# Patient Record
Sex: MALE | Race: BLACK OR AFRICAN AMERICAN | URBAN - METROPOLITAN AREA
[De-identification: names, ages, dates, MRNs, and addresses within clinical notes are randomized per-mention and may not be internally consistent; named-entity substitution may affect disease eponyms.]

---

## 2020-12-14 ENCOUNTER — OFFICE VISIT (OUTPATIENT)
Dept: URBAN - METROPOLITAN AREA CLINIC 74 | Facility: CLINIC | Age: 30
End: 2020-12-14

## 2023-07-14 ENCOUNTER — CLAIMS CREATED FROM THE CLAIM WINDOW (OUTPATIENT)
Dept: URBAN - METROPOLITAN AREA CLINIC 40 | Facility: CLINIC | Age: 33
End: 2023-07-14
Payer: SELF-PAY

## 2023-07-14 ENCOUNTER — OFFICE VISIT (OUTPATIENT)
Dept: URBAN - METROPOLITAN AREA CLINIC 40 | Facility: CLINIC | Age: 33
End: 2023-07-14

## 2023-07-14 ENCOUNTER — WEB ENCOUNTER (OUTPATIENT)
Dept: URBAN - METROPOLITAN AREA CLINIC 40 | Facility: CLINIC | Age: 33
End: 2023-07-14

## 2023-07-14 VITALS
SYSTOLIC BLOOD PRESSURE: 110 MMHG | HEART RATE: 80 BPM | HEIGHT: 71 IN | WEIGHT: 185 LBS | TEMPERATURE: 95.9 F | BODY MASS INDEX: 25.9 KG/M2 | DIASTOLIC BLOOD PRESSURE: 80 MMHG

## 2023-07-14 DIAGNOSIS — R12 CHRONIC HEARTBURN: ICD-10-CM

## 2023-07-14 DIAGNOSIS — K21.9 ACID REFLUX: ICD-10-CM

## 2023-07-14 PROBLEM — 266435005: Status: ACTIVE | Noted: 2023-07-14

## 2023-07-14 PROCEDURE — 99203 OFFICE O/P NEW LOW 30 MIN: CPT | Performed by: INTERNAL MEDICINE

## 2023-07-14 PROCEDURE — 99203 OFFICE O/P NEW LOW 30 MIN: CPT | Performed by: NURSE PRACTITIONER

## 2023-07-14 RX ORDER — OMEPRAZOLE 40 MG/1
1 CAPSULE 30 MINUTES BEFORE MORNING MEAL CAPSULE, DELAYED RELEASE ORAL ONCE A DAY
Qty: 30 | Refills: 3 | OUTPATIENT
Start: 2023-07-14

## 2023-07-14 NOTE — PHYSICAL EXAM GASTROINTESTINAL
Abdomen , soft, nontender, nondistended , no guarding or rigidity , no masses palpable , normal bowel sounds , Liver and Spleen,  no hepatosplenomegaly , liver nontender, healed surgical scar noted to RLQ

## 2023-07-14 NOTE — HPI-TODAY'S VISIT:
31 y/o male new patient presents with c/o heartburn and reflux for past 5-6 years, he states it has gotten worse past 2 months. He says he took zantac over the counter for years until it was recalled and now for the past 6 months he has been taking OTC nexium and pepcid every day. He takes nexium 1 pill every day and pepcid most days. He says he has stomach pain and points to epigastric region that comes and goes, non radiating. He says the pepcid will help the heart burn pain when he takes it. He says he does have reflux at times. He denies dysphagia, globus. He denies constipation, diarrhea, blood in stool, lower abdominal pain. He has occasional loud stomach noises. He denies feeling relief by eating or coating his stomach. He says he can not pin point any food triggers and that the heart burn and epigastric pain happens all the time regardless of what food he eats. His diet consists of meat, rice, tomato, spices. He says he will sometimes have cookies and milk and lay down 30 mins to hour later for bed. Denies alcohol, tobacco. He will use hookah pipe maybe once every 2 months. Denies family history of gastric, colon cancer. He takes Excedrin migraine and ibuprofen for headache maybe once monthly.  He endorses he was born in [place] but moved back to [place] with family when young and has now been back here for a few years. He has history in 2006 of "swallowing a toothpick" in [place] and states it stayed in his stomach for a while and then he had an appendectomy and they took the toothpick out with the appendix as that is where it got stuck. He says they did an ultrasound to see it.

## 2023-08-05 ENCOUNTER — WEB ENCOUNTER (OUTPATIENT)
Dept: URBAN - METROPOLITAN AREA CLINIC 40 | Facility: CLINIC | Age: 33
End: 2023-08-05

## 2023-08-11 ENCOUNTER — OFFICE VISIT (OUTPATIENT)
Dept: URBAN - METROPOLITAN AREA SURGERY CENTER 30 | Facility: SURGERY CENTER | Age: 33
End: 2023-08-11

## 2023-08-11 ENCOUNTER — WEB ENCOUNTER (OUTPATIENT)
Dept: URBAN - METROPOLITAN AREA CLINIC 40 | Facility: CLINIC | Age: 33
End: 2023-08-11

## 2023-08-11 ENCOUNTER — LAB OUTSIDE AN ENCOUNTER (OUTPATIENT)
Dept: URBAN - METROPOLITAN AREA CLINIC 40 | Facility: CLINIC | Age: 33
End: 2023-08-11

## 2023-09-14 ENCOUNTER — OFFICE VISIT (OUTPATIENT)
Dept: URBAN - METROPOLITAN AREA CLINIC 74 | Facility: CLINIC | Age: 33
End: 2023-09-14

## 2023-09-14 RX ORDER — OMEPRAZOLE 40 MG/1
1 CAPSULE 30 MINUTES BEFORE MORNING MEAL CAPSULE, DELAYED RELEASE ORAL ONCE A DAY
Qty: 30 | Refills: 3 | Status: ACTIVE | COMMUNITY
Start: 2023-07-14

## 2023-09-14 NOTE — HPI-TODAY'S VISIT:
32-year-old male patient with past medical history as listed below known to myself last office visit 7/14/2023 for heartburn and abdominal pain started on omeprazole 40 mg daily and Pepcid at night discussed antireflux diet heartburn diet.  Were holding off on EGD at that time he continued to have stomach pains was advised to try over-the-counter Gaviscon before lunch and dinner he can continue to have stomach pains all over and requested ultrasound order placed for ultrasound of the abdomen he was scheduled for an EGD with Dr. Mao on 8/11/2023 but it was canceled as Dr. Mao was off.  His ultrasound showed no abnormality he was advised to continue the medications for heartburn.  Discussed we can order lab work to evaluate liver renal and pancreas function if his symptoms persist and can order EGD if upper GI symptoms persist

## 2023-10-10 ENCOUNTER — DASHBOARD ENCOUNTERS (OUTPATIENT)
Age: 33
End: 2023-10-10

## 2023-10-11 ENCOUNTER — OFFICE VISIT (OUTPATIENT)
Dept: URBAN - METROPOLITAN AREA CLINIC 40 | Facility: CLINIC | Age: 33
End: 2023-10-11

## 2023-10-11 RX ORDER — OMEPRAZOLE 40 MG/1
1 CAPSULE 30 MINUTES BEFORE MORNING MEAL CAPSULE, DELAYED RELEASE ORAL ONCE A DAY
Qty: 30 | Refills: 3 | COMMUNITY
Start: 2023-07-14

## 2023-10-11 NOTE — HPI-TODAY'S VISIT:
32-year-old male patient with past medical history as listed below known to myself last office visit 7/14/2023 for heartburn and abdominal pain started on omeprazole 40 mg daily and Pepcid at night discussed antireflux diet heartburn diet. Were holding off on EGD at that time he continued to have stomach pains was advised to try over-the-counter Gaviscon before lunch and dinner he can continue to have stomach pains all over and requested ultrasound order placed for ultrasound of the abdomen he was scheduled for an EGD with Dr. Mao on 8/11/2023 but it was canceled as Dr. Mao was off. His ultrasound showed no abnormality he was advised to continue the medications for heartburn. Discussed we can order lab work to evaluate liver renal and pancreas function if his symptoms persist and can order EGD if upper GI symptoms persist.